# Patient Record
Sex: FEMALE | Race: WHITE
[De-identification: names, ages, dates, MRNs, and addresses within clinical notes are randomized per-mention and may not be internally consistent; named-entity substitution may affect disease eponyms.]

---

## 2017-04-10 ENCOUNTER — HOSPITAL ENCOUNTER (EMERGENCY)
Dept: HOSPITAL 80 - FED | Age: 27
Discharge: HOME | End: 2017-04-10
Payer: COMMERCIAL

## 2017-04-10 VITALS — RESPIRATION RATE: 16 BRPM | TEMPERATURE: 98.6 F | OXYGEN SATURATION: 99 %

## 2017-04-10 VITALS — HEART RATE: 87 BPM | SYSTOLIC BLOOD PRESSURE: 131 MMHG | DIASTOLIC BLOOD PRESSURE: 88 MMHG

## 2017-04-10 DIAGNOSIS — N12: Primary | ICD-10-CM

## 2017-04-10 LAB
BACTERIA #/AREA URNS HPF: (no result) /HPF
COLOR UR: YELLOW
MUCOUS THREADS #/AREA URNS LPF: (no result) /LPF
NITRITE UR QL STRIP: NEGATIVE
PH UR STRIP: 6 [PH] (ref 5–7.5)
RBC #/AREA URNS HPF: (no result) /HPF (ref 0–3)
SP GR UR STRIP: 1.01 (ref 1–1.03)
WBC #/AREA URNS HPF: (no result) /HPF (ref 0–3)

## 2017-04-10 NOTE — EDPHY
H & P


Stated Complaint: R flank pain, RLQ pain,


Time Seen by Provider: 04/10/17 14:26





- Personal History


LMP (Females 10-55): 15-21 Days Ago


Current Tetanus/Diphtheria Vaccine: Yes


Current Tetanus Diphtheria and Acellular Pertussis (TDAP): Yes


Tetanus Vaccine Date: 2015





- Medical/Surgical History


Hx Asthma: No


Hx Chronic Respiratory Disease: No


Hx Diabetes: No


Hx Cardiac Disease: No


Hx Renal Disease: No


Hx Cirrhosis: No


Hx Alcoholism: No


Hx HIV/AIDS: No


Hx Splenectomy or Spleen Trauma: No


Other PMH: R breast lumpectomy, endometrious, kidney stones.





- Social History


Smoking Status: Never smoked


Constitutional: 


 Initial Vital Signs











Temperature (C)  37.0 C   04/10/17 14:07


 


Heart Rate  73   04/10/17 14:07


 


Respiratory Rate  16   04/10/17 14:07


 


Blood Pressure  112/87 H  04/10/17 14:07


 


O2 Sat (%)  99   04/10/17 14:07








 











O2 Delivery Mode               Room Air














Allergies/Adverse Reactions: 


 





acetaminophen [From Percocet] Allergy (Intermediate, Verified 04/10/17 14:16)


 Itching


oxycodone [From Percocet] Allergy (Intermediate, Verified 04/10/17 14:16)


 Itching


Penicillins Allergy (Intermediate, Verified 04/10/17 14:16)


 Rash








Home Medications: 














 Medication  Instructions  Recorded


 


Methotrexate  04/10/17


 


Remicade Inj 100 mg (*)  04/10/17














Medical Decision Making


ED Course/Re-evaluation: 





CHIEF COMPLAINT:  Flank pain, abdominal pain





HISTORY OF PRESENT ILLNESS:  The patient is a 25 y/o female, with a history of 

rheumatoid arthritis, endometriosis, and kidney stones, complaining of 

intermittent flank pain onset Friday, 3 days ago. Her flank pain was initially 

located on the left and she describes the pain as a spasm sometimes sharp and 10

/10 in severity and sometimes dissipating completely or to a dull ache. It has 

now shifted to the right side and is associated with right abdominal pain that 

also waxes and wanes. Her pain has become more intense and frequent since 

onset. She has associated dysuria without hematuria and chills. Her symptoms 

are aggravated by movement and alleviated at rest. Her LMP was 2 weeks ago, but 

is normally light due to her Mirena IUD. 





REVIEW OF SYSTEMS:  





A 10 point review of systems was performed and is negative with the exception 

of the elements mentioned in the history of present illness.





PHYSICAL EXAM:  





HR, BP, O2 Sat, RR.  Temp noted


General Appearance:  Alert, well hydrated, appropriate, and non-toxic appearing.


Head:  Atraumatic without scalp tenderness or obvious injury


Eyes:  Pupils equal, round, reactive to light and accommodation, EOMI, no trauma

, no injection.


Nose:  Atraumatic, no rhinorrhea, clear.


Throat:  mucus membranes moist.


Neck:  Supple, nontender, no lymphadenopathy.


Respiratory:  No retractions, no distress, no wheezes, and no accessory muscle 

use.  Lungs are clear to auscultation bilaterally.


Cardiovascular:  Regular rate and rhythm, no murmurs, rubs, or gallops.Good 

capillary refill all extremities.


Gastrointestinal:  Abdomen is soft, mild RUQ tenderness, mild bilateral lower 

abdominal tenderness, positive Philip's sign, non-distended, no masses, no 

rebound, no guarding, no peritoneal signs.


Musculoskeletal:  Normal active ROM of all extremities, atraumatic. Right CVA 

tenderness


Neurological:  Alert, appropriate, and interactive.  Nonfocal neuro exam. 


Skin:  No rashes, good turgor, no nodules on palpation.





Past medical history: endometriosis, 2 kidney stones, rheumatoid arthritis - 

methotrexate, Remicade, NSAIDs, peptic ulcer disease; Mirena IUD


Past surgical history: breast lumpectomy


Family history: noncontributory


Social history: Lives in [place]





DIFFERENTIAL DIAGNOSIS:   The differential diagnosis for the patient's flank 

pain included but was not limited to musculoskeletal causes, kidney stone, 

pyelonephritis, shingles, diverticulitis, appendicitis, and aortic aneurysm.





MEDICAL DECISION MAKING:  





This is a 25 y/o female with a history of RA, kidney stones, and endometriosis 

who presents with a 3-day history of progressive flank and abdominal pain with 

associated pain at the end of urination. She has right-sided abdominal 

tenderness and right CVA tenderness on exam.  She is afebrile here. Plan for IV

, labs, and UA. Will consider imaging depending on labs. 





Patient's presentation and UA is consistent with pyelonephritis. Keflex 

prescribed with return precautions for worsening symptoms. She can use 

ibuprofen as need for pain. She is comfortable with this plan. 





- Data Points


Laboratory Results: 


 











  04/10/17





  14:20


 


Urine Color  YELLOW 





  


 


Urine Appearance  HAZY 





  


 


Urine pH  6.0 





   (5.0-7.5) 


 


Ur Specific Gravity  1.008 





   (1.002-1.030) 


 


Urine Protein  NEGATIVE 





   (NEGATIVE) 


 


Urine Ketones  TRACE  H 





   (NEGATIVE) 


 


Urine Blood  2+  H 





   (NEGATIVE) 


 


Urine Nitrate  NEGATIVE 





   (NEGATIVE) 


 


Urine Bilirubin  NEGATIVE 





   (NEGATIVE) 


 


Urine Urobilinogen  NEGATIVE EU EU





   (0.2-1.0) 


 


Ur Leukocyte Esterase  1+  H 





   (NEGATIVE) 


 


Urine RBC  1-3 /hpf /hpf





   (0-3) 


 


Urine WBC  25-50 /hpf H /hpf





   (0-3) 


 


Ur Epithelial Cells  TRACE /lpf /lpf





   (NONE-1+) 


 


Urine Bacteria  1+ /hpf H /hpf





   (NONE SEEN) 


 


Urine Mucus  TRACE /lpf /lpf





   (NONE-1+) 


 


Ur Culture Indicated?  INDICATED  H 





   (NI) 


 


Urine Glucose  NEGATIVE 





   (NEGATIVE) 














Departure





- Departure


Disposition: Home, Routine, Self-Care


Clinical Impression: 


 Pyelonephritis





Condition: Good


Instructions:  Flank Pain (ED), Urinary Tract Infection in Women (ED), Kidney 

Infection (ED)


Additional Instructions: 


1. Take Keflex as prescribed. Be sure to complete entire prescription even if 

your symptoms have improved. 


2. Use ibuprofen as directed on the packaging as needed for pain for the next 3-

4 days.


3. Follow up with your primary care provider for symptoms not improved over the 

next week. 


4. Return to the ED for severe worsening pain, inability to urinate, 

uncontrollable vomiting, or other worsening of condition.


Referrals: 


Gina Jaquez MD [Medical Doctor] - As per Instructions


Report Scribed for: Carlito Mulligan


Report Scribed by: Melba Velarde


Date of Report: 04/10/17


Time of Report: 14:52

## 2019-03-21 ENCOUNTER — HOSPITAL ENCOUNTER (OUTPATIENT)
Dept: HOSPITAL 80 - FIMAGING | Age: 29
End: 2019-03-21
Attending: OBSTETRICS & GYNECOLOGY
Payer: COMMERCIAL

## 2019-03-21 DIAGNOSIS — Z97.5: ICD-10-CM

## 2019-03-21 DIAGNOSIS — N80.9: ICD-10-CM

## 2019-03-21 DIAGNOSIS — R10.2: Primary | ICD-10-CM
